# Patient Record
Sex: MALE | Race: AMERICAN INDIAN OR ALASKA NATIVE
[De-identification: names, ages, dates, MRNs, and addresses within clinical notes are randomized per-mention and may not be internally consistent; named-entity substitution may affect disease eponyms.]

---

## 2017-05-20 ENCOUNTER — HOSPITAL ENCOUNTER (EMERGENCY)
Dept: HOSPITAL 5 - ED | Age: 32
Discharge: HOME | End: 2017-05-20
Payer: COMMERCIAL

## 2017-05-20 VITALS — DIASTOLIC BLOOD PRESSURE: 98 MMHG | SYSTOLIC BLOOD PRESSURE: 143 MMHG

## 2017-05-20 DIAGNOSIS — Z88.8: ICD-10-CM
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
## 2017-05-20 DIAGNOSIS — M54.2: Primary | ICD-10-CM
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
## 2017-05-20 DIAGNOSIS — Z88.6: ICD-10-CM

## 2017-05-20 DIAGNOSIS — Z86.711: ICD-10-CM

## 2017-05-20 DIAGNOSIS — I10: ICD-10-CM
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
## 2017-05-20 PROCEDURE — 99282 EMERGENCY DEPT VISIT SF MDM: CPT
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
## 2018-06-17 ENCOUNTER — HOSPITAL ENCOUNTER (EMERGENCY)
Dept: HOSPITAL 5 - ED | Age: 33
Discharge: HOME | End: 2018-06-17
Payer: SELF-PAY

## 2018-06-17 VITALS — DIASTOLIC BLOOD PRESSURE: 94 MMHG | SYSTOLIC BLOOD PRESSURE: 138 MMHG

## 2018-06-17 DIAGNOSIS — Z86.711: ICD-10-CM

## 2018-06-17 DIAGNOSIS — Z88.6: ICD-10-CM
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
## 2018-06-17 DIAGNOSIS — F12.10: ICD-10-CM

## 2018-06-17 DIAGNOSIS — I10: ICD-10-CM
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
## 2018-06-17 DIAGNOSIS — S61.210A: Primary | ICD-10-CM

## 2018-06-17 DIAGNOSIS — G62.9: ICD-10-CM

## 2018-06-17 DIAGNOSIS — F17.200: ICD-10-CM

## 2018-06-17 PROCEDURE — 99281 EMR DPT VST MAYX REQ PHY/QHP: CPT
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
## 2022-06-28 ENCOUNTER — HOSPITAL ENCOUNTER (EMERGENCY)
Dept: HOSPITAL 5 - ED | Age: 37
Discharge: HOME | End: 2022-06-28
Payer: SELF-PAY

## 2022-06-28 VITALS — SYSTOLIC BLOOD PRESSURE: 150 MMHG | DIASTOLIC BLOOD PRESSURE: 100 MMHG

## 2022-06-28 DIAGNOSIS — J32.9: Primary | ICD-10-CM

## 2022-06-28 DIAGNOSIS — Z88.5: ICD-10-CM

## 2022-06-28 DIAGNOSIS — Z88.6: ICD-10-CM

## 2022-06-28 DIAGNOSIS — F17.200: ICD-10-CM

## 2022-06-28 DIAGNOSIS — I10: ICD-10-CM

## 2022-06-28 DIAGNOSIS — F14.90: ICD-10-CM

## 2022-06-28 DIAGNOSIS — Z79.899: ICD-10-CM

## 2022-06-28 DIAGNOSIS — Z86.711: ICD-10-CM

## 2022-06-28 DIAGNOSIS — Z72.89: ICD-10-CM

## 2022-06-28 DIAGNOSIS — F12.90: ICD-10-CM

## 2022-06-28 LAB
ALBUMIN SERPL-MCNC: 4.2 G/DL (ref 3.9–5)
ALT SERPL-CCNC: 15 UNITS/L (ref 7–56)
BASOPHILS # (AUTO): 0 K/MM3 (ref 0–0.1)
BASOPHILS NFR BLD AUTO: 0.6 % (ref 0–1.8)
BUN SERPL-MCNC: 10 MG/DL (ref 9–20)
BUN/CREAT SERPL: 10 %
CALCIUM SERPL-MCNC: 9.5 MG/DL (ref 8.4–10.2)
EOSINOPHIL # BLD AUTO: 0.4 K/MM3 (ref 0–0.4)
EOSINOPHIL NFR BLD AUTO: 6.3 % (ref 0–4.3)
HCT VFR BLD CALC: 41.7 % (ref 35.5–45.6)
HEMOLYSIS INDEX: 4
HGB BLD-MCNC: 13.9 GM/DL (ref 11.8–15.2)
LYMPHOCYTES # BLD AUTO: 2 K/MM3 (ref 1.2–5.4)
LYMPHOCYTES NFR BLD AUTO: 33.2 % (ref 13.4–35)
MCHC RBC AUTO-ENTMCNC: 33 % (ref 32–34)
MCV RBC AUTO: 93 FL (ref 84–94)
MONOCYTES # (AUTO): 0.4 K/MM3 (ref 0–0.8)
MONOCYTES % (AUTO): 7.5 % (ref 0–7.3)
PLATELET # BLD: 269 K/MM3 (ref 140–440)
RBC # BLD AUTO: 4.49 M/MM3 (ref 3.65–5.03)

## 2022-06-28 PROCEDURE — 99284 EMERGENCY DEPT VISIT MOD MDM: CPT

## 2022-06-28 PROCEDURE — 80053 COMPREHEN METABOLIC PANEL: CPT

## 2022-06-28 PROCEDURE — 93005 ELECTROCARDIOGRAM TRACING: CPT

## 2022-06-28 PROCEDURE — 84484 ASSAY OF TROPONIN QUANT: CPT

## 2022-06-28 PROCEDURE — 96372 THER/PROPH/DIAG INJ SC/IM: CPT

## 2022-06-28 PROCEDURE — 85025 COMPLETE CBC W/AUTO DIFF WBC: CPT

## 2022-06-28 PROCEDURE — 71046 X-RAY EXAM CHEST 2 VIEWS: CPT

## 2022-06-28 PROCEDURE — 83880 ASSAY OF NATRIURETIC PEPTIDE: CPT

## 2022-06-28 PROCEDURE — 36415 COLL VENOUS BLD VENIPUNCTURE: CPT

## 2022-06-28 NOTE — EMERGENCY DEPARTMENT REPORT
ED Shortness of Breath HPI





- General


Chief Complaint: Dyspnea/Respdistress


Stated Complaint: SOB


Source: patient


Mode of arrival: Ambulatory


Limitations: No Limitations





- History of Present Illness


Initial Comments: 





37-year-old male with past medical history of hypertension, brain shunt with IVC

filter reports having shortness of breath since Friday with nasal congestion and

cough, after being exposed to coworker with similar symptoms.  Patient also 

reports having sinus pain and pressure prior to Friday and was supposed to be 

treated with antibiotics but his PCP did not call in medications.  Patient 

denies chest pain no dizziness no headaches no weakness no numbness reported.  

No other acute symptoms reported at this time.  Patient reports having a 

negative COVID test and is not COVID vaccinated.





- Related Data


                                  Previous Rx's











 Medication  Instructions  Recorded  Last Taken  Type


 


Lisinopril/Hydrochlorothiazide 1 tab PO QDAY 30 Days  tablet 04/15/14 Unknown Rx





[Zestoretic 20-12.5 mg]    


 


Ciprofloxacin HCl [Ciprofloxacin 500 mg PO Q12HR #20 tab 16 Unknown Rx





TAB]    


 


Oxycodone HCl/Acetaminophen 1 each PO Q6HR PRN #20 tablet 16 Unknown Rx





[Percocet 7.5/325 mg]    


 


Promethazine [Phenergan TAB] 25 mg PO Q6HR PRN #20 tab 16 Unknown Rx


 


Sulfamethoxazole/Trimethoprim 1 each PO BID #20 tablet 16 Unknown Rx





[Bactrim DS TAB]    


 


Oxycodone HCl/Acetaminophen 1 each PO Q6HR PRN #20 tablet 16 Unknown Rx





[Percocet 10/325 mg]    


 


Cyclobenzaprine [Flexeril 10 MG 10 mg PO TID #15 tablet 17 Unknown Rx





TAB]    


 


Acetaminophen/Codeine [Tylenol 1 tab PO Q6H PRN #12 tab 10/29/18 Unknown Rx





/Codeine # 3 tab]    


 


Amoxicillin 500 mg PO BID #14 capsule 10/29/18 Unknown Rx


 


Neomy/Polymyx B/Hc (Otic) Soln 4 drops AD TID 5 Days #1 bottle 10/29/18 Unknown 

Rx





[Cortisporin (Otic) Soln]    


 


Albuterol Mdi (or & Nicu Only) 2 puff IH QID PRN #8.5 gram 22 Unknown Rx





[ProAir HFA Inhaler]    


 


Amoxicillin/K Clav Tab [Augmentin 1 tab PO Q12HR 7 Days #14 tab 22 Unknown

 Rx





875 mg]    


 


predniSONE [Deltasone] 40 mg PO QDAY 5 Days #10 1000units 22 Unknown Rx











                                    Allergies











Allergy/AdvReac Type Severity Reaction Status Date / Time


 


aspirin Allergy  Hives Verified 22 11:19


 


morphine Allergy  Hives Verified 22 11:19


 


NSAIDS (Non-Steroidal Allergy  Hives Verified 22 11:19





Anti-Inflamma     














ED Review of Systems


ROS: 


Stated complaint: SOB


Other details as noted in HPI





Constitutional: denies: chills, fever


Eyes: denies: eye pain, eye discharge, vision change


ENT: denies: ear pain, throat pain


Respiratory: shortness of breath.  denies: cough, wheezing


Cardiovascular: denies: chest pain, palpitations


Endocrine: no symptoms reported


Gastrointestinal: denies: abdominal pain, nausea, diarrhea


Genitourinary: denies: urgency, dysuria


Musculoskeletal: denies: back pain, joint swelling, arthralgia


Skin: denies: rash, lesions


Neurological: denies: headache, weakness, paresthesias


Psychiatric: denies: anxiety, depression


Hematological/Lymphatic: denies: easy bleeding, easy bruising





ED Past Medical Hx





- Past Medical History


Hx Hypertension: Yes


Hx Pulmonary Embolism: Yes


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 

headaches, required removal and a  shunt 





- Surgical History


Additional Surgical History: Craniotomy with colloid cyst removal and  shunt 

placement ,IVC filter due to DVT to rt leg





- Social History


Smoking Status: Current Every Day Smoker


Substance Use Type: Alcohol, Cocaine, Marijuana





- Medications


Home Medications: 


                                Home Medications











 Medication  Instructions  Recorded  Confirmed  Last Taken  Type


 


Lisinopril/Hydrochlorothiazide 1 tab PO QDAY 30 Days  tablet 04/15/14 04/25/16 

Unknown Rx





[Zestoretic 20-12.5 mg]     


 


Ciprofloxacin HCl [Ciprofloxacin 500 mg PO Q12HR #20 tab 16  Unknown Rx





TAB]     


 


Oxycodone HCl/Acetaminophen 1 each PO Q6HR PRN #20 tablet 16  Unknown Rx





[Percocet 7.5/325 mg]     


 


Promethazine [Phenergan TAB] 25 mg PO Q6HR PRN #20 tab 16  Unknown Rx


 


Sulfamethoxazole/Trimethoprim 1 each PO BID #20 tablet 16  Unknown Rx





[Bactrim DS TAB]     


 


Oxycodone HCl/Acetaminophen 1 each PO Q6HR PRN #20 tablet 16  Unknown Rx





[Percocet 10/325 mg]     


 


Cyclobenzaprine [Flexeril 10 MG 10 mg PO TID #15 tablet 17  Unknown Rx





TAB]     


 


Acetaminophen/Codeine [Tylenol 1 tab PO Q6H PRN #12 tab 10/29/18  Unknown Rx





/Codeine # 3 tab]     


 


Amoxicillin 500 mg PO BID #14 capsule 10/29/18  Unknown Rx


 


Neomy/Polymyx B/Hc (Otic) Soln 4 drops AD TID 5 Days #1 bottle 10/29/18  Unknown

Rx





[Cortisporin (Otic) Soln]     


 


Albuterol Mdi (or & Nicu Only) 2 puff IH QID PRN #8.5 gram 22  Unknown Rx





[ProAir HFA Inhaler]     


 


Amoxicillin/K Clav Tab [Augmentin 1 tab PO Q12HR 7 Days #14 tab 22  

Unknown Rx





875 mg]     


 


predniSONE [Deltasone] 40 mg PO QDAY 5 Days #10 1000units 22  Unknown Rx














ED Physical Exam





- General


Limitations: No Limitations


General appearance: alert, in no apparent distress





- Head


Head exam: Present: atraumatic, normocephalic





- Eye


Eye exam: Present: normal appearance





- ENT


ENT exam: Present: mucous membranes moist





- Neck


Neck exam: Present: normal inspection





- Respiratory


Respiratory exam: Present: normal lung sounds bilaterally.  Absent: respiratory 

distress, wheezes





- Cardiovascular


Cardiovascular Exam: Present: regular rate, normal rhythm.  Absent: systolic 

murmur, diastolic murmur, rubs, gallop





- GI/Abdominal


GI/Abdominal exam: Present: soft, normal bowel sounds





- Rectal


Rectal exam: Present: deferred





- Extremities Exam


Extremities exam: Present: normal inspection





- Back Exam


Back exam: Present: normal inspection





- Neurological Exam


Neurological exam: Present: alert, oriented X3





- Psychiatric


Psychiatric exam: Present: normal affect, normal mood





- Skin


Skin exam: Present: warm, dry, intact, normal color.  Absent: rash





ED Course


                                   Vital Signs











  22





  11:18 19:53


 


Temperature 98.5 F 98.2 F


 


Pulse Rate 73 75


 


Respiratory 18 22





Rate  


 


Blood Pressure 152/99 


 


Blood Pressure  150/100





[Left]  


 


O2 Sat by Pulse 100 





Oximetry  














ED Medical Decision Making





- Lab Data


Result diagrams: 


                                 22 11:40





                                 22 11:40





- Radiology Data





[facility]  


                                     [address] [place] [address]  


 


                                            XRay Report   


                                               Signed  


 


Patient: YOJANA WHITFIELD                                                    

            MR#: [id number]  


[id number]          


: 1985                                                                

Acct:[id number]      


 


Age/Sex: 37 / M                                                                

ADM Date: 22     


 


Loc: ED       


Attending Dr:   


 


 


Ordering Physician: JOLIE UMANA MD  


Date of Service: 22  


Procedure(s): XR chest routine 2V  


Accession Number(s): [id number]  


 


cc: ED MD DONG   


 


Fluoro Time In Minutes:   


 


CHEST 2 VIEWS   


 


 INDICATION / CLINICAL INFORMATION: Dyspnea.  


 


 COMPARISON: None available.  


 


 FINDINGS:  


 


 SUPPORT DEVICES: None.  


 HEART / MEDIASTINUM: No significant abnormality.   


 LUNGS / PLEURA: No significant pulmonary or pleural abnormality. No pneumotho

rax.   


 


 ADDITIONAL FINDINGS: No significant additional findings.  


 


 IMPRESSION:  


 1. No acute findings.  


 


 Signer Name: Omero Montejo MD   


 Signed: 2022 11:50 AM  


 Workstation Name: [id number]   


 


 


Transcribed By: CW  


Dictated By: ERICA MONTEJO MD  


Electronically Authenticated By: ERICA MONTEJO MD    


Signed Date/Time: 22 1150                                


 


 


 


DD/DT: 22 1150                                                            

  


TD/TT:








- Medical Decision Making





37-year-old male history of hypertension brain surgery with IVC shunt reports to

 the ER with complaints of shortness of breath with congestion since Friday and 

cough after being exposed to his manager with similar symptoms.  Patient reports

 having sinus pressure and pain prior to this Friday.


Patient reports no other acute symptoms at this moment.


Labs were reviewed chest x-ray reviewed no acute process noted.











At the moment of discharge 


Patient informed nursing staff about his history of PE and is currently not on 

blood thinners reports being on blood thinners for several years due to 

patient's medical history patient informed that CTA to rule out worsening or 

possible new onset of blood clot needs to be evaluated patient reports that he 

does not want to have a CT and refuses CT.  Patient informed of the risks 

associated with PE patient understands patient informed to come back to ER if 

symptoms get worse.


Patient signed out AMA


Critical care attestation.: 


If time is entered above; I have spent that time in minutes in the direct care 

of this critically ill patient, excluding procedure time.








ED Disposition


Clinical Impression: 


 Shortness of breath





Sinusitis


Qualifiers:


 Sinusitis location: frontal Chronicity: acute Recurrence: non-recurrent 

Qualified Code(s): J01.10 - Acute frontal sinusitis, unspecified





Disposition:  HOME / SELF CARE / HOMELESS


Is pt being admited?: No


Does the pt Need Aspirin: No


Condition: Stable


Instructions:  Shortness of Breath, Adult, Easy-to-Read


Additional Instructions: 


FOllow up with your primary care provider.


If symptoms get worse please report back to the ER.


Prescriptions: 


Amoxicillin/K Clav Tab [Augmentin 875 mg] 1 tab PO Q12HR 7 Days #14 tab


predniSONE [Deltasone] 40 mg PO QDAY 5 Days #10 1000units


Albuterol Mdi (or & Nicu Only) [ProAir HFA Inhaler] 2 puff IH QID PRN #8.5 gram


 PRN Reason: Shortness Of Breath


Referrals: 


MELONIE BUCKLEY MD [Primary Care Provider] - 3-5 Days


Forms:  AMA Form


Time of Disposition: 20:20

## 2022-06-29 NOTE — ELECTROCARDIOGRAPH REPORT
[facility]

                                       

Test Date:    2022               Test Time:    11:26:10

Pat Name:     YOJANA WHITFIELD              Department:   

Patient ID:   [id number]          Room:          

Gender:       M                        Technician:   LEA

:          1985               Requested By: ED DOC

Order Number: [id number]              Reading MD:   Isaiah Lara

                                 Measurements

Intervals                              Axis          

Rate:         66                       P:            54

SC:           180                      QRS:          59

QRSD:         78                       T:            52

QT:           395                                    

QTc:          415                                    

                           Interpretive Statements

Sinus rhythm

No previous ECG available for comparison

Electronically Signed On 2022 9:41:49 EDT by Isaiah Lara